# Patient Record
Sex: MALE | Race: WHITE | Employment: FULL TIME | URBAN - METROPOLITAN AREA
[De-identification: names, ages, dates, MRNs, and addresses within clinical notes are randomized per-mention and may not be internally consistent; named-entity substitution may affect disease eponyms.]

---

## 2024-03-27 ENCOUNTER — OFFICE VISIT (OUTPATIENT)
Age: 39
End: 2024-03-27

## 2024-03-27 VITALS
TEMPERATURE: 97.2 F | RESPIRATION RATE: 15 BRPM | OXYGEN SATURATION: 99 % | HEART RATE: 70 BPM | DIASTOLIC BLOOD PRESSURE: 76 MMHG | SYSTOLIC BLOOD PRESSURE: 144 MMHG | BODY MASS INDEX: 31.4 KG/M2 | WEIGHT: 231.8 LBS | HEIGHT: 72 IN

## 2024-03-27 DIAGNOSIS — J32.9 RHINOSINUSITIS: Primary | ICD-10-CM

## 2024-03-27 RX ORDER — DOXYCYCLINE HYCLATE 100 MG
100 TABLET ORAL 2 TIMES DAILY
Qty: 14 TABLET | Refills: 0 | Status: SHIPPED | OUTPATIENT
Start: 2024-03-27 | End: 2024-04-03

## 2024-03-27 ASSESSMENT — ENCOUNTER SYMPTOMS
SINUS COMPLAINT: 1
DIARRHEA: 0
SORE THROAT: 1
COUGH: 1
SINUS PAIN: 1
WHEEZING: 0
EYE ITCHING: 0
NAUSEA: 0
VOMITING: 0
SHORTNESS OF BREATH: 0
EYE REDNESS: 0
EYE PAIN: 0
SINUS PRESSURE: 1
TROUBLE SWALLOWING: 0
RHINORRHEA: 1

## 2024-03-27 ASSESSMENT — PATIENT HEALTH QUESTIONNAIRE - PHQ9
SUM OF ALL RESPONSES TO PHQ QUESTIONS 1-9: 0
1. LITTLE INTEREST OR PLEASURE IN DOING THINGS: NOT AT ALL
SUM OF ALL RESPONSES TO PHQ QUESTIONS 1-9: 0
SUM OF ALL RESPONSES TO PHQ QUESTIONS 1-9: 0
SUM OF ALL RESPONSES TO PHQ9 QUESTIONS 1 & 2: 0
2. FEELING DOWN, DEPRESSED OR HOPELESS: NOT AT ALL
SUM OF ALL RESPONSES TO PHQ QUESTIONS 1-9: 0

## 2024-03-27 NOTE — PROGRESS NOTES
97.2 °F (36.2 °C) (Temporal)   Resp 15   Ht 1.829 m (6')   Wt 105.1 kg (231 lb 12.8 oz)   SpO2 99%   BMI 31.44 kg/m²      No results found for this visit on 03/27/24.    Physical Exam  Vitals reviewed.   Constitutional:       General: He is awake. He is not in acute distress.     Appearance: Normal appearance. He is well-developed and well-groomed.   HENT:      Head: Normocephalic.      Right Ear: Hearing and external ear normal. No middle ear effusion.      Left Ear: Hearing and external ear normal.  No middle ear effusion.      Nose: Mucosal edema present.      Right Turbinates: Swollen.      Left Turbinates: Swollen.      Right Sinus: Maxillary sinus tenderness and frontal sinus tenderness present.      Left Sinus: Maxillary sinus tenderness and frontal sinus tenderness present.      Mouth/Throat:      Lips: Pink. No lesions.      Mouth: Mucous membranes are moist.      Pharynx: Uvula midline. Pharyngeal swelling and posterior oropharyngeal erythema present.      Tonsils: No tonsillar exudate.   Eyes:      General: Lids are normal.      Conjunctiva/sclera: Conjunctivae normal.   Neck:      Trachea: No tracheal deviation.   Cardiovascular:      Rate and Rhythm: Normal rate and regular rhythm.      Heart sounds: Normal heart sounds.   Pulmonary:      Effort: Pulmonary effort is normal.      Breath sounds: Normal breath sounds.   Musculoskeletal:      Cervical back: Neck supple.   Lymphadenopathy:      Head:      Right side of head: Submandibular and tonsillar adenopathy present. No submental, preauricular, posterior auricular or occipital adenopathy.      Left side of head: Submandibular and tonsillar adenopathy present. No submental, preauricular, posterior auricular or occipital adenopathy.   Neurological:      Mental Status: He is alert and oriented to person, place, and time.   Psychiatric:         Behavior: Behavior is cooperative.         ASSESSMENT and PLAN    Mitchell was seen today for nasal congestion

## 2024-04-03 ENCOUNTER — TELEPHONE (OUTPATIENT)
Age: 39
End: 2024-04-03

## 2024-04-03 NOTE — TELEPHONE ENCOUNTER
Follow call made to evaluate symptoms. Pt states that he felt better day two on ABT . Pt with no further concerns at this time.

## 2024-06-10 ENCOUNTER — OFFICE VISIT (OUTPATIENT)
Age: 39
End: 2024-06-10

## 2024-06-10 VITALS
WEIGHT: 230.9 LBS | OXYGEN SATURATION: 97 % | TEMPERATURE: 97.2 F | RESPIRATION RATE: 19 BRPM | SYSTOLIC BLOOD PRESSURE: 120 MMHG | BODY MASS INDEX: 30.6 KG/M2 | HEIGHT: 73 IN | HEART RATE: 74 BPM | DIASTOLIC BLOOD PRESSURE: 82 MMHG

## 2024-06-10 DIAGNOSIS — H00.14 CHALAZION LEFT UPPER EYELID: Primary | ICD-10-CM

## 2024-06-10 ASSESSMENT — PATIENT HEALTH QUESTIONNAIRE - PHQ9
2. FEELING DOWN, DEPRESSED OR HOPELESS: NOT AT ALL
SUM OF ALL RESPONSES TO PHQ QUESTIONS 1-9: 0
SUM OF ALL RESPONSES TO PHQ QUESTIONS 1-9: 0
1. LITTLE INTEREST OR PLEASURE IN DOING THINGS: NOT AT ALL
SUM OF ALL RESPONSES TO PHQ QUESTIONS 1-9: 0
SUM OF ALL RESPONSES TO PHQ9 QUESTIONS 1 & 2: 0
SUM OF ALL RESPONSES TO PHQ QUESTIONS 1-9: 0

## 2024-06-10 ASSESSMENT — ENCOUNTER SYMPTOMS
FOREIGN BODY SENSATION: 0
EYE ITCHING: 0
SHORTNESS OF BREATH: 0
EYE REDNESS: 0
SINUS PRESSURE: 0
EYE PAIN: 0
NAUSEA: 0
PHOTOPHOBIA: 0
RHINORRHEA: 0
EYE DISCHARGE: 0
ABDOMINAL PAIN: 0
BLURRED VISION: 0
EYES NEGATIVE: 1
DOUBLE VISION: 0
COUGH: 0

## 2024-06-10 ASSESSMENT — VISUAL ACUITY: OU: 1

## 2024-06-10 NOTE — PROGRESS NOTES
PROGRESS NOTE    SUBJECTIVE:   Mitchell Galan is a 39 y.o. male seen for swelling to the L upper eyelid for the past 2 days. Patient states point was a \"kirby\" that he popped and then it became a diffuse swelling. Patient states no pain except for palpation to area. Patient denies any discharge, drainage, itching, or changes in vision.    Chief Complaint    left upper eyelid red and edematous         Eye Problem   The left eye is affected. This is a new problem. The current episode started in the past 7 days. The problem occurs constantly. The problem has been unchanged. There was no injury mechanism. There is No known exposure to pink eye. He Does not wear contacts. Pertinent negatives include no blurred vision, eye discharge, double vision, eye redness, fever, foreign body sensation, itching, nausea, photophobia or weakness. He has tried eye drops for the symptoms. The treatment provided no relief.       No current outpatient medications on file.     No current facility-administered medications for this visit.      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       Social History     Tobacco Use    Smoking status: Never    Smokeless tobacco: Never        Review of Systems   Constitutional:  Negative for fatigue and fever.   HENT:  Negative for rhinorrhea and sinus pressure.    Eyes: Negative.  Negative for blurred vision, double vision, photophobia, pain, discharge, redness and itching.   Respiratory:  Negative for cough and shortness of breath.    Cardiovascular: Negative.    Gastrointestinal:  Negative for abdominal pain and nausea.   Musculoskeletal: Negative.    Skin:         Swelling and redness to the L upper eyelid   Neurological:  Negative for dizziness, weakness, light-headedness and numbness.          OBJECTIVE:  /82 (Site: Right Upper Arm, Position: Sitting,

## 2024-06-17 ENCOUNTER — TELEPHONE (OUTPATIENT)
Age: 39
End: 2024-06-17

## 2024-06-17 NOTE — TELEPHONE ENCOUNTER
Follow up call made to evaluate symptoms reported on 6/10/24. Per patient , left upper eyelid is no longer red and edematous. Pt with no further concerns at this time.